# Patient Record
Sex: FEMALE | Race: WHITE
[De-identification: names, ages, dates, MRNs, and addresses within clinical notes are randomized per-mention and may not be internally consistent; named-entity substitution may affect disease eponyms.]

---

## 2017-08-21 ENCOUNTER — HOSPITAL ENCOUNTER (EMERGENCY)
Dept: HOSPITAL 54 - ER | Age: 37
Discharge: HOME | End: 2017-08-21
Payer: COMMERCIAL

## 2017-08-21 DIAGNOSIS — Z53.21: Primary | ICD-10-CM

## 2017-12-27 ENCOUNTER — HOSPITAL ENCOUNTER (EMERGENCY)
Dept: HOSPITAL 54 - ER | Age: 37
Discharge: LEFT BEFORE DISCHARGE | End: 2017-12-27
Payer: COMMERCIAL

## 2017-12-27 DIAGNOSIS — Z53.21: Primary | ICD-10-CM

## 2018-01-23 ENCOUNTER — HOSPITAL ENCOUNTER (EMERGENCY)
Dept: HOSPITAL 54 - ER | Age: 38
Discharge: HOME | End: 2018-01-23
Payer: COMMERCIAL

## 2018-01-23 VITALS — HEIGHT: 64 IN | BODY MASS INDEX: 25.61 KG/M2 | WEIGHT: 150 LBS

## 2018-01-23 VITALS — SYSTOLIC BLOOD PRESSURE: 132 MMHG | DIASTOLIC BLOOD PRESSURE: 83 MMHG

## 2018-01-23 DIAGNOSIS — R05: Primary | ICD-10-CM

## 2018-01-23 PROCEDURE — Z7610: HCPCS

## 2018-01-23 PROCEDURE — A4606 OXYGEN PROBE USED W OXIMETER: HCPCS

## 2019-08-22 ENCOUNTER — HOSPITAL ENCOUNTER (EMERGENCY)
Dept: HOSPITAL 54 - ER | Age: 39
LOS: 1 days | Discharge: HOME | End: 2019-08-23
Payer: COMMERCIAL

## 2019-08-22 VITALS — BODY MASS INDEX: 24.75 KG/M2 | HEIGHT: 64 IN | WEIGHT: 145 LBS

## 2019-08-22 DIAGNOSIS — Z90.49: ICD-10-CM

## 2019-08-22 DIAGNOSIS — R51: Primary | ICD-10-CM

## 2019-08-22 LAB
BASOPHILS # BLD AUTO: 0.1 /CMM (ref 0–0.2)
BASOPHILS NFR BLD AUTO: 1.4 % (ref 0–2)
BUN SERPL-MCNC: 10 MG/DL (ref 7–18)
CALCIUM SERPL-MCNC: 9.2 MG/DL (ref 8.5–10.1)
CHLORIDE SERPL-SCNC: 103 MMOL/L (ref 98–107)
CO2 SERPL-SCNC: 29 MMOL/L (ref 21–32)
CREAT SERPL-MCNC: 0.7 MG/DL (ref 0.6–1.3)
EOSINOPHIL NFR BLD AUTO: 1.3 % (ref 0–6)
GLUCOSE SERPL-MCNC: 98 MG/DL (ref 74–106)
HCT VFR BLD AUTO: 41 % (ref 33–45)
HGB BLD-MCNC: 13.8 G/DL (ref 11.5–14.8)
LYMPHOCYTES NFR BLD AUTO: 2.9 /CMM (ref 0.8–4.8)
LYMPHOCYTES NFR BLD AUTO: 32.1 % (ref 20–44)
MCHC RBC AUTO-ENTMCNC: 34 G/DL (ref 31–36)
MCV RBC AUTO: 89 FL (ref 82–100)
MONOCYTES NFR BLD AUTO: 0.3 /CMM (ref 0.1–1.3)
MONOCYTES NFR BLD AUTO: 3.7 % (ref 2–12)
NEUTROPHILS # BLD AUTO: 5.5 /CMM (ref 1.8–8.9)
NEUTROPHILS NFR BLD AUTO: 61.5 % (ref 43–81)
PLATELET # BLD AUTO: 293 /CMM (ref 150–450)
POTASSIUM SERPL-SCNC: 3.9 MMOL/L (ref 3.5–5.1)
RBC # BLD AUTO: 4.6 MIL/UL (ref 4–5.2)
SODIUM SERPL-SCNC: 140 MMOL/L (ref 136–145)
WBC NRBC COR # BLD AUTO: 8.9 K/UL (ref 4.3–11)

## 2019-08-22 NOTE — NUR
PT TO CT Physical Exam  Mallampati: II  TM Distance: >3 FB  Neck ROM: Full  cardiovascular exam normal  pulmonary exam normal  abdominal exam normal  dental exam normal      Anesthesia Plan  ASA Status: 3  Anesthesia Type: General  Reviewed: Lab Results, Nursing Notes, Pre-Induction Reassessment, Beta Blocker Status, NPO Status, Medications, Past Med History, Problem List, Allergies, Patient Summary, Consultations and EKG  The proposed anesthetic plan, including its risks and benefits, have been discussed with the Patient and Spouse - along with the risks and benefits of alternatives.  Questions were encouraged and answered and the patient and/or representative agrees to proceed.      Anesthesia ROS/Med Hx        Pulmonary Review:  The patient is a current smoker.   Overall Review of Systems Comments:  H/o Lupus anticoagulant, DVT

## 2019-08-22 NOTE — NUR
"C/C HEADACHE X2MO, -BLURRY VISION, +N/-V, -DIZZINESS" PT AAOX4, -SOB, NAD 
NOTED, VSS ,PENDING MD ZURITA

## 2019-08-23 VITALS — SYSTOLIC BLOOD PRESSURE: 123 MMHG | DIASTOLIC BLOOD PRESSURE: 85 MMHG

## 2020-02-25 ENCOUNTER — HOSPITAL ENCOUNTER (EMERGENCY)
Dept: HOSPITAL 54 - ER | Age: 40
LOS: 1 days | Discharge: HOME | End: 2020-02-26
Payer: COMMERCIAL

## 2020-02-25 VITALS
SYSTOLIC BLOOD PRESSURE: 151 MMHG | DIASTOLIC BLOOD PRESSURE: 88 MMHG | WEIGHT: 145 LBS | HEIGHT: 66 IN | BODY MASS INDEX: 23.3 KG/M2
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
## 2020-02-25 DIAGNOSIS — N39.0: ICD-10-CM
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
## 2020-02-25 DIAGNOSIS — S16.1XXA: Primary | ICD-10-CM
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
## 2020-02-25 PROCEDURE — 84703 CHORIONIC GONADOTROPIN ASSAY: CPT

## 2020-02-25 PROCEDURE — 99284 EMERGENCY DEPT VISIT MOD MDM: CPT

## 2020-02-25 PROCEDURE — 81001 URINALYSIS AUTO W/SCOPE: CPT

## 2020-02-25 PROCEDURE — 87086 URINE CULTURE/COLONY COUNT: CPT

## 2020-02-25 PROCEDURE — 96372 THER/PROPH/DIAG INJ SC/IM: CPT

## 2020-02-25 NOTE — NUR
PATIENT CAME TO ER BED 9 C/O RIGHT NECK PAIN THAT RADIATES TO THE RIGHT ARM AND 
LOWER BACK. PT STATES THAT SHE WOKE UP THIS MORNING AND FELT PAIN AND STATES IT 
HAS BEEN GETTING WORSE. 9/10 SHARP PAIN. AAOX4. NO SOB. BREATHING EVENLY AND 
UNLABORED ON ROOM AIR.

## 2020-02-26 LAB
PH UR STRIP: 7 [PH] (ref 5–8)
RBC #/AREA URNS HPF: (no result) /HPF (ref 0–2)
UROBILINOGEN UR STRIP-MCNC: 0.2 EU/DL

## 2020-04-11 ENCOUNTER — HOSPITAL ENCOUNTER (EMERGENCY)
Dept: HOSPITAL 54 - ER | Age: 40
Discharge: HOME | End: 2020-04-11
Payer: COMMERCIAL

## 2020-04-11 VITALS — BODY MASS INDEX: 20.89 KG/M2 | HEIGHT: 66 IN | WEIGHT: 130 LBS

## 2020-04-11 VITALS — SYSTOLIC BLOOD PRESSURE: 127 MMHG | DIASTOLIC BLOOD PRESSURE: 86 MMHG

## 2020-04-11 DIAGNOSIS — R51: Primary | ICD-10-CM

## 2020-04-11 NOTE — NUR
"Been having Headache x6mos worse today. +Nausea", TO ER BED 1, HOOKED TO 
MONITOR, CHANGED TO HOSP GOWN, WARM BLANKET PROVIDED, AWAITING MD ZURITA.

## 2021-08-19 ENCOUNTER — HOSPITAL ENCOUNTER (INPATIENT)
Dept: HOSPITAL 54 - ER | Age: 41
LOS: 4 days | Discharge: HOME | DRG: 137 | End: 2021-08-23
Attending: INTERNAL MEDICINE | Admitting: INTERNAL MEDICINE
Payer: COMMERCIAL

## 2021-08-19 VITALS — SYSTOLIC BLOOD PRESSURE: 120 MMHG | DIASTOLIC BLOOD PRESSURE: 65 MMHG

## 2021-08-19 VITALS — DIASTOLIC BLOOD PRESSURE: 64 MMHG | SYSTOLIC BLOOD PRESSURE: 118 MMHG

## 2021-08-19 VITALS — SYSTOLIC BLOOD PRESSURE: 118 MMHG | DIASTOLIC BLOOD PRESSURE: 64 MMHG

## 2021-08-19 VITALS — HEIGHT: 64 IN | WEIGHT: 150 LBS | BODY MASS INDEX: 25.61 KG/M2

## 2021-08-19 DIAGNOSIS — J12.82: ICD-10-CM

## 2021-08-19 DIAGNOSIS — J96.01: ICD-10-CM

## 2021-08-19 DIAGNOSIS — U07.1: Primary | ICD-10-CM

## 2021-08-19 DIAGNOSIS — N39.0: ICD-10-CM

## 2021-08-19 LAB
ALBUMIN SERPL BCP-MCNC: 3.1 G/DL (ref 3.4–5)
ALP SERPL-CCNC: 44 U/L (ref 46–116)
ALT SERPL W P-5'-P-CCNC: 51 U/L (ref 12–78)
AST SERPL W P-5'-P-CCNC: 48 U/L (ref 15–37)
BASOPHILS # BLD AUTO: 0 K/UL (ref 0–0.2)
BASOPHILS NFR BLD AUTO: 0.3 % (ref 0–2)
BILIRUB DIRECT SERPL-MCNC: 0.2 MG/DL (ref 0–0.2)
BILIRUB SERPL-MCNC: 0.4 MG/DL (ref 0.2–1)
BUN SERPL-MCNC: 7 MG/DL (ref 7–18)
CALCIUM SERPL-MCNC: 8.3 MG/DL (ref 8.5–10.1)
CHLORIDE SERPL-SCNC: 100 MMOL/L (ref 98–107)
CO2 SERPL-SCNC: 30 MMOL/L (ref 21–32)
COLOR UR: YELLOW
CREAT SERPL-MCNC: 0.7 MG/DL (ref 0.6–1.3)
EOSINOPHIL NFR BLD AUTO: 0 % (ref 0–6)
GLUCOSE SERPL-MCNC: 100 MG/DL (ref 74–106)
HCT VFR BLD AUTO: 38 % (ref 33–45)
HGB BLD-MCNC: 12.6 G/DL (ref 11.5–14.8)
LYMPHOCYTES NFR BLD AUTO: 1.2 K/UL (ref 0.8–4.8)
LYMPHOCYTES NFR BLD AUTO: 19.6 % (ref 20–44)
MCHC RBC AUTO-ENTMCNC: 34 G/DL (ref 31–36)
MCV RBC AUTO: 90 FL (ref 82–100)
MONOCYTES NFR BLD AUTO: 0.3 K/UL (ref 0.1–1.3)
MONOCYTES NFR BLD AUTO: 4.4 % (ref 2–12)
NEUTROPHILS # BLD AUTO: 4.5 K/UL (ref 1.8–8.9)
NEUTROPHILS NFR BLD AUTO: 75.7 % (ref 43–81)
PH UR STRIP: 7.5 [PH] (ref 5–8)
PLATELET # BLD AUTO: 221 K/UL (ref 150–450)
POTASSIUM SERPL-SCNC: 3.6 MMOL/L (ref 3.5–5.1)
PROT SERPL-MCNC: 7.8 G/DL (ref 6.4–8.2)
RBC # BLD AUTO: 4.19 MIL/UL (ref 4–5.2)
RBC #/AREA URNS HPF: (no result) /HPF (ref 0–2)
SODIUM SERPL-SCNC: 140 MMOL/L (ref 136–145)
UROBILINOGEN UR STRIP-MCNC: 1 EU/DL
WBC #/AREA URNS HPF: (no result) /HPF (ref 0–3)
WBC NRBC COR # BLD AUTO: 5.9 K/UL (ref 4.3–11)

## 2021-08-19 PROCEDURE — A4216 STERILE WATER/SALINE, 10 ML: HCPCS

## 2021-08-19 PROCEDURE — G0378 HOSPITAL OBSERVATION PER HR: HCPCS

## 2021-08-19 PROCEDURE — C9803 HOPD COVID-19 SPEC COLLECT: HCPCS

## 2021-08-19 PROCEDURE — XW033E5 INTRODUCTION OF REMDESIVIR ANTI-INFECTIVE INTO PERIPHERAL VEIN, PERCUTANEOUS APPROACH, NEW TECHNOLOGY GROUP 5: ICD-10-PCS | Performed by: NURSE PRACTITIONER

## 2021-08-19 RX ADMIN — ENOXAPARIN SODIUM SCH MG: 40 INJECTION SUBCUTANEOUS at 19:10

## 2021-08-19 RX ADMIN — VITAMIN D, TAB 1000IU (100/BT) SCH UNIT: 25 TAB at 19:09

## 2021-08-19 RX ADMIN — Medication SCH MG: at 19:09

## 2021-08-19 NOTE — NUR
41 years old female with hx covid positive presents to er with nausea vomiting 
past 6 days worse today.

## 2021-08-19 NOTE — NUR
RN NOTES



RECEIVED PT FROM ER VIA [facility] ACCOMPANIED BY 2 ER STAFF AND TRANSFERRED TO BED VIA 2 PERSON 
ASSIST. PT IS A/OX4;PT ON 2L OF 02 VIA NC WITH RESPIRATIONS EVEN AND UNLABORED. 
COMPREHENSIVE PHYSICAL ASSESSMENT AND PATIENT CARE DONE. CALL LIGHT WITHIN REACH, SAFETY 
MEASURES AND ISOLATION PRECAUTION IN PLACE, WILL CONTINUE MONITOR AND ASSESS THROUGHOUT THE 
SHIFT. WILL CARRY OUT MD ORDERS ACCORDINGLY. CHARGE RN MADE AWARE.

## 2021-08-19 NOTE — NUR
RN NOTES



@2120 CALLED [facility]  AND SPOKE WITH RENU AND  ADVISED THAT THERE'S AN ORDER FOR 
REMDESIVIR FOR 2130. HE SAID IT NEEDS TO BE ROUTED TO ONCALL PHARMACY AS THEY DONT VERIFY 
FOR THIS KIND OF MEDICATION. CHARGE RN MADE AWARE AND NSG SUP. 



[facility]29[facility] [phone], CALLED AND SPOKE WITH 
HYUN HE MENTIONED THAT REMDESIVIR NEEDS TO HAVE APPROVAL FROM [facility] AND NEEDS TO BE DONE IN 
THE MORNING. HE SAID MDs ARE AWARE ABOUT THIS PROTOCOL. CHARGE RN MADE AWARE AND NSG SUP. 
WILL ENDORSE THIS INFO TO THE MORNING SHIFT.

## 2021-08-20 VITALS — SYSTOLIC BLOOD PRESSURE: 108 MMHG | DIASTOLIC BLOOD PRESSURE: 70 MMHG

## 2021-08-20 VITALS — DIASTOLIC BLOOD PRESSURE: 65 MMHG | SYSTOLIC BLOOD PRESSURE: 135 MMHG

## 2021-08-20 VITALS — DIASTOLIC BLOOD PRESSURE: 68 MMHG | SYSTOLIC BLOOD PRESSURE: 114 MMHG

## 2021-08-20 VITALS — DIASTOLIC BLOOD PRESSURE: 75 MMHG | SYSTOLIC BLOOD PRESSURE: 116 MMHG

## 2021-08-20 VITALS — DIASTOLIC BLOOD PRESSURE: 79 MMHG | SYSTOLIC BLOOD PRESSURE: 124 MMHG

## 2021-08-20 VITALS — SYSTOLIC BLOOD PRESSURE: 106 MMHG | DIASTOLIC BLOOD PRESSURE: 63 MMHG

## 2021-08-20 VITALS — SYSTOLIC BLOOD PRESSURE: 114 MMHG | DIASTOLIC BLOOD PRESSURE: 68 MMHG

## 2021-08-20 LAB
ALBUMIN SERPL BCP-MCNC: 2.8 G/DL (ref 3.4–5)
ALP SERPL-CCNC: 45 U/L (ref 46–116)
ALT SERPL W P-5'-P-CCNC: 47 U/L (ref 12–78)
AST SERPL W P-5'-P-CCNC: 38 U/L (ref 15–37)
BASE EXCESS BLDA CALC-SCNC: 2.7 MMOL/L
BASOPHILS # BLD AUTO: 0 K/UL (ref 0–0.2)
BASOPHILS NFR BLD AUTO: 0.2 % (ref 0–2)
BILIRUB SERPL-MCNC: 0.3 MG/DL (ref 0.2–1)
BUN SERPL-MCNC: 9 MG/DL (ref 7–18)
CALCIUM SERPL-MCNC: 8.5 MG/DL (ref 8.5–10.1)
CHLORIDE SERPL-SCNC: 103 MMOL/L (ref 98–107)
CO2 SERPL-SCNC: 26 MMOL/L (ref 21–32)
CREAT SERPL-MCNC: 0.7 MG/DL (ref 0.6–1.3)
DO-HGB MFR BLDA: 54.2 MMHG
EOSINOPHIL NFR BLD AUTO: 0 % (ref 0–6)
GLUCOSE SERPL-MCNC: 185 MG/DL (ref 74–106)
HCT VFR BLD AUTO: 35 % (ref 33–45)
HGB BLD-MCNC: 12.1 G/DL (ref 11.5–14.8)
INHALED O2 CONCENTRATION: 21 %
LYMPHOCYTES NFR BLD AUTO: 0.8 K/UL (ref 0.8–4.8)
LYMPHOCYTES NFR BLD AUTO: 22.2 % (ref 20–44)
MAGNESIUM SERPL-MCNC: 1.9 MG/DL (ref 1.8–2.4)
MCHC RBC AUTO-ENTMCNC: 34 G/DL (ref 31–36)
MCV RBC AUTO: 89 FL (ref 82–100)
MONOCYTES NFR BLD AUTO: 0.2 K/UL (ref 0.1–1.3)
MONOCYTES NFR BLD AUTO: 5.3 % (ref 2–12)
NEUTROPHILS # BLD AUTO: 2.7 K/UL (ref 1.8–8.9)
NEUTROPHILS NFR BLD AUTO: 72.3 % (ref 43–81)
PCO2 TEMP ADJ BLDA: 36.9 MMHG (ref 35–45)
PH TEMP ADJ BLDA: 7.47 [PH] (ref 7.35–7.45)
PHOSPHATE SERPL-MCNC: 3 MG/DL (ref 2.5–4.9)
PLATELET # BLD AUTO: 238 K/UL (ref 150–450)
PO2 TEMP ADJ BLDA: 51.3 MMHG (ref 75–100)
POTASSIUM SERPL-SCNC: 3.6 MMOL/L (ref 3.5–5.1)
PROT SERPL-MCNC: 7.6 G/DL (ref 6.4–8.2)
RBC # BLD AUTO: 3.99 MIL/UL (ref 4–5.2)
SAO2 % BLDA: 88.5 % (ref 92–98.5)
SODIUM SERPL-SCNC: 141 MMOL/L (ref 136–145)
WBC NRBC COR # BLD AUTO: 3.8 K/UL (ref 4.3–11)

## 2021-08-20 RX ADMIN — ENOXAPARIN SODIUM SCH MG: 40 INJECTION SUBCUTANEOUS at 20:29

## 2021-08-20 RX ADMIN — DEXTROSE MONOHYDRATE SCH MLS/HR: 50 INJECTION, SOLUTION INTRAVENOUS at 17:21

## 2021-08-20 RX ADMIN — VITAMIN D, TAB 1000IU (100/BT) SCH UNIT: 25 TAB at 08:26

## 2021-08-20 RX ADMIN — DEXTROSE MONOHYDRATE SCH MG: 50 INJECTION, SOLUTION INTRAVENOUS at 08:26

## 2021-08-20 RX ADMIN — DEXTROSE MONOHYDRATE SCH MLS/HR: 50 INJECTION, SOLUTION INTRAVENOUS at 16:11

## 2021-08-20 RX ADMIN — Medication SCH MG: at 08:26

## 2021-08-20 NOTE — NUR
TELE RN CLOSING NOTE: 



PATIENT RESTING COMFORTABLY, AO X 4, ON 2 LITERS O2 NASAL CANULA, NO SIGNS OF RESPIRATORY 
DISTRESS,  SATURATING @ 98%. SINUS RHYTHM HR 90.DENIES CHEST PAIN/DISCOMFORT. RT AC G 20 
FLUSHES WELL, SITE CLEAR. REGULAR DIET. BRP. AMBULATORY. CALL LIGHT WITHIN REACH, SAFETY 
MEASURES AND ISOLATION PRECAUTION IN PLACE,

PATIENT KEPT CLEAN & COMFORTABLE WITHIN THE SHIFT. WILL ENDORSE TO INCOMING SHIFT RN WITH 
STABLE VITAL SIGN AND FOR CONTINUITY OF CARE.

## 2021-08-20 NOTE — NUR
TELE RN AM NOTES



PT IN BED, A/OX4;PT ON ROOM AIR AT THIS TIME, WITH 95% O2 SAT. DENIES SOB, RESPIRATIONS EVEN 
AND UNLABORED. SINUS RHYTHM HR 95. DENIES CHEST PAIN/DISCOMFORT. RT AC G 20 FLUSHES WELL, 
SITE CLEAR. REGULAR DIET. BRP. AMBULATORY. CALL LIGHT WITHIN REACH, SAFETY MEASURES AND 
ISOLATION PRECAUTION IN PLACE, POC  DISCUSSED. VERBALIZED UNDERSTANDING. WILL CONTINUE TO 
MONITOR AND ASSESS THROUGHOUT THE SHIFT. WILL CARRY OUT MD ORDERS ACCORDINGLY.

## 2021-08-20 NOTE — NUR
RN OPENING NOTE

Patient is awake, A&Ox4. Walking around in room. Patient reports feeling okay at this time. 
Denies SOB or any type of distress. Patient reports her main struggle overnight in the 
hospital is getting enough sleep -told patient she has an order for PRN Ambien at bedtime 
that can help with sleep. O2 100% on O2 2L via NC. Will continue to monitor.

## 2021-08-20 NOTE — NUR
RN CLOSING NOTE: 



PATIENT REMAINS IN ROOM IN NO SIGNS OF RESPIRATORY DISTRESS, PATIENT ON ROOM AIR ;TOLERATING 
WELL SATURATING @ >95% SP02. SAFETY MEASURES IMPLEMENTED, BED IN LOWEST POSITION, LOCKED, 
SIDE RAILS UP, CALL LIGHT WITHIN REACH. ALL NEEDS AND ORDERS ADDRESSED DURING THE SHIFT. IV 
ACCESS MAINTAINED INTACT, SECURED AND FLUSHING WELL. ALL DUE MEDS GIVEN AS ORDERED & 
SCHEDULED ; PATIENT TOLERATED WELL. PATIENT KEPT CLEAN & COMFORTABLE WITHIN THE SHIFT. 
PATIENT ENDORSED TO INCOMING SHIFT RN WITH STABLE VITAL SIGN AND FOR CONTINUITY OF CARE.

## 2021-08-21 VITALS — DIASTOLIC BLOOD PRESSURE: 77 MMHG | SYSTOLIC BLOOD PRESSURE: 117 MMHG

## 2021-08-21 VITALS — DIASTOLIC BLOOD PRESSURE: 72 MMHG | SYSTOLIC BLOOD PRESSURE: 110 MMHG

## 2021-08-21 VITALS — SYSTOLIC BLOOD PRESSURE: 122 MMHG | DIASTOLIC BLOOD PRESSURE: 78 MMHG

## 2021-08-21 VITALS — SYSTOLIC BLOOD PRESSURE: 120 MMHG | DIASTOLIC BLOOD PRESSURE: 78 MMHG

## 2021-08-21 VITALS — SYSTOLIC BLOOD PRESSURE: 123 MMHG | DIASTOLIC BLOOD PRESSURE: 84 MMHG

## 2021-08-21 VITALS — DIASTOLIC BLOOD PRESSURE: 80 MMHG | SYSTOLIC BLOOD PRESSURE: 123 MMHG

## 2021-08-21 LAB
ALBUMIN SERPL BCP-MCNC: 2.8 G/DL (ref 3.4–5)
ALP SERPL-CCNC: 41 U/L (ref 46–116)
ALT SERPL W P-5'-P-CCNC: 53 U/L (ref 12–78)
AST SERPL W P-5'-P-CCNC: 39 U/L (ref 15–37)
BASOPHILS # BLD AUTO: 0 K/UL (ref 0–0.2)
BASOPHILS NFR BLD AUTO: 0.3 % (ref 0–2)
BILIRUB DIRECT SERPL-MCNC: 0.1 MG/DL (ref 0–0.2)
BILIRUB SERPL-MCNC: 0.3 MG/DL (ref 0.2–1)
EOSINOPHIL NFR BLD AUTO: 0 % (ref 0–6)
HCT VFR BLD AUTO: 36 % (ref 33–45)
HGB BLD-MCNC: 12 G/DL (ref 11.5–14.8)
LYMPHOCYTES NFR BLD AUTO: 1.3 K/UL (ref 0.8–4.8)
LYMPHOCYTES NFR BLD AUTO: 12.9 % (ref 20–44)
MCHC RBC AUTO-ENTMCNC: 34 G/DL (ref 31–36)
MCV RBC AUTO: 90 FL (ref 82–100)
MONOCYTES NFR BLD AUTO: 0.5 K/UL (ref 0.1–1.3)
MONOCYTES NFR BLD AUTO: 5 % (ref 2–12)
NEUTROPHILS # BLD AUTO: 8.3 K/UL (ref 1.8–8.9)
NEUTROPHILS NFR BLD AUTO: 81.8 % (ref 43–81)
PLATELET # BLD AUTO: 297 K/UL (ref 150–450)
PROT SERPL-MCNC: 7.3 G/DL (ref 6.4–8.2)
RBC # BLD AUTO: 3.96 MIL/UL (ref 4–5.2)
WBC NRBC COR # BLD AUTO: 10.2 K/UL (ref 4.3–11)

## 2021-08-21 RX ADMIN — DEXTROSE MONOHYDRATE SCH MG: 50 INJECTION, SOLUTION INTRAVENOUS at 09:15

## 2021-08-21 RX ADMIN — DEXTROSE MONOHYDRATE SCH MLS/HR: 50 INJECTION, SOLUTION INTRAVENOUS at 16:57

## 2021-08-21 RX ADMIN — VITAMIN D, TAB 1000IU (100/BT) SCH UNIT: 25 TAB at 09:15

## 2021-08-21 RX ADMIN — DEXTROSE MONOHYDRATE SCH MLS/HR: 50 INJECTION, SOLUTION INTRAVENOUS at 15:37

## 2021-08-21 RX ADMIN — SODIUM CHLORIDE SCH MLS/HR: 9 INJECTION, SOLUTION INTRAVENOUS at 12:12

## 2021-08-21 RX ADMIN — ENOXAPARIN SODIUM SCH MG: 40 INJECTION SUBCUTANEOUS at 20:37

## 2021-08-21 RX ADMIN — Medication SCH MG: at 09:15

## 2021-08-21 NOTE — NUR
TELE/RN OPENING NOTE



RECEIVED PATIENT RESTING IN BED. AWAKE, ALERT AND ORIENTED X 4. ABLE TO MAKE NEEDS KNOWN. 
DENIES PAIN AT THIS TIME. CONTINUES ON 2L O2 VIA NC WITH NO S/SX OF RESPIRATORY DISTRESS 
NOTED. CONTINUES ON TELE MONITOR WITH CURRENT READING SR. IV ACCESS TO RIGHT AC #20G INTACT, 
PATENT AND SALINE LOCKED. CONTINUES ON IV ABX. CALL LIGHT WITHIN REACH. ASPIRATION, FALL AND 
SAFETY PRECAUTIONS MAINTAINED. WILL CONTINUE TO MONITOR.

## 2021-08-21 NOTE — NUR
TELE RN OPENING NOTES



RECEIVED PATIENT AWAKE IN BED. ALERT AND ORIENTED X4. NO SIGNS OR SYMPTOMS OF DISTRESS 
NOTED. BREATHING IS EVEN AND UNLABORED. NO COMPLAINTS OF PAIN AT THIS TIME. IV ACCESS 
RHAND#20 PATENT, INTACT AND FLUSHING WELL. PATIENT IS CURRENTLY ON  NC 2L WITH SPO2 
SATURATING AT 97%. PATIENT ON EXTERNAL TELE MONITOR WITH READING SR 90. PATIENT IS ABLE TO 
MAKE NEEDS KNOWN. SAFETY MEASURES ARE IN PLACE WITH BED LOCKED AT LOW POSITION, SIDE RAILS 
UP X 2. WILL CONTINUE TO MONITOR THROUGHOUT SHIFT.

## 2021-08-21 NOTE — NUR
TELE RN CLOSING NOTES



PATIENT AWAKE IN BED. ALERT AND ORIENTED X4. NO SIGNS OR SYMPTOMS OF DISTRESS NOTED. 
BREATHING IS EVEN AND UNLABORED. NO COMPLAINTS OF PAIN AT THIS TIME. IV ACCESS RHAND#20 
PATENT, INTACT AND FLUSHING WELL. ALL NEEDS MET THROUGH OUT SHIFT. SAFETY MEASURES 
MAINTAINED WITH BED LOCKED AT LOW POSITION, SIDE RAILS UP X 2. WILL ENDORSE CONTINUITY OF 
CARE TO ONCOMING SHIFT.

## 2021-08-21 NOTE — NUR
TELE RN NOTES



PATIENT WAS SEEN BY DR. FELISHA FLYNN WITH ORDER TO TITRATE O2 TO 1L, ORDERS READ BACK 
AND CARRIED OUT. WILL CONTINUE TO MONITOR PATIENT O2 SATURATION.

## 2021-08-21 NOTE — NUR
TELE RN CLOSING NOTES

 Patient slept well throughout the night after PRN Ambien administration. Denies SOB or any 
kind of distress. O2 saturations 97% and above. NSR on monitor.

## 2021-08-22 VITALS — DIASTOLIC BLOOD PRESSURE: 76 MMHG | SYSTOLIC BLOOD PRESSURE: 115 MMHG

## 2021-08-22 VITALS — DIASTOLIC BLOOD PRESSURE: 68 MMHG | SYSTOLIC BLOOD PRESSURE: 119 MMHG

## 2021-08-22 VITALS — SYSTOLIC BLOOD PRESSURE: 114 MMHG | DIASTOLIC BLOOD PRESSURE: 78 MMHG

## 2021-08-22 VITALS — DIASTOLIC BLOOD PRESSURE: 68 MMHG | SYSTOLIC BLOOD PRESSURE: 115 MMHG

## 2021-08-22 VITALS — DIASTOLIC BLOOD PRESSURE: 72 MMHG | SYSTOLIC BLOOD PRESSURE: 122 MMHG

## 2021-08-22 LAB
ALBUMIN SERPL BCP-MCNC: 2.9 G/DL (ref 3.4–5)
ALP SERPL-CCNC: 42 U/L (ref 46–116)
ALT SERPL W P-5'-P-CCNC: 120 U/L (ref 12–78)
AST SERPL W P-5'-P-CCNC: 100 U/L (ref 15–37)
BILIRUB DIRECT SERPL-MCNC: 0.2 MG/DL (ref 0–0.2)
BILIRUB SERPL-MCNC: 0.4 MG/DL (ref 0.2–1)
CRP SERPL-MCNC: 2.9 MG/DL (ref 0–0.9)
PROT SERPL-MCNC: 7.5 G/DL (ref 6.4–8.2)

## 2021-08-22 RX ADMIN — SODIUM CHLORIDE SCH MLS/HR: 9 INJECTION, SOLUTION INTRAVENOUS at 12:00

## 2021-08-22 RX ADMIN — Medication SCH MG: at 08:31

## 2021-08-22 RX ADMIN — DEXTROSE MONOHYDRATE SCH MG: 50 INJECTION, SOLUTION INTRAVENOUS at 08:31

## 2021-08-22 RX ADMIN — DEXTROSE MONOHYDRATE SCH MLS/HR: 50 INJECTION, SOLUTION INTRAVENOUS at 15:42

## 2021-08-22 RX ADMIN — ENOXAPARIN SODIUM SCH MG: 40 INJECTION SUBCUTANEOUS at 21:19

## 2021-08-22 RX ADMIN — VITAMIN D, TAB 1000IU (100/BT) SCH UNIT: 25 TAB at 08:31

## 2021-08-22 RX ADMIN — DEXTROSE MONOHYDRATE SCH MLS/HR: 50 INJECTION, SOLUTION INTRAVENOUS at 17:05

## 2021-08-22 NOTE — NUR
TELE/RN CLOSING NOTE



PATIENT RESTING IN BED. AWAKE, ALERT AND ORIENTED X 4. ABLE TO MAKE NEEDS KNOWN. DENIES PAIN 
AT THIS TIME.  STEADY AND AMBULATORY. CONTINUES ON 2L O2 VIA NC WITH NO S/SX OF RESPIRATORY 
DISTRESS NOTED. CONTINUES ON TELE MONITOR WITH CURRENT READING SR. IV ACCESS TO RIGHT AC 
#20G INTACT, PATENT AND SALINE LOCKED. CONTINUES ON IV ABX. CALL LIGHT WITHIN REACH. 
ASPIRATION, FALL AND SAFETY PRECAUTIONS MAINTAINED. WILL ENDORSE TO THE NEXT SHIFT FOR [facility].

## 2021-08-22 NOTE — NUR
TELE RN OPENING NOTES



PT A/OX4; ABLE TO MAKE NEEDS KNOWN. ON O2 2LPM VIA N/C; TOLERATING WELL.  EXTERNAL CARDIAC 
MONITOR READS NSR AT 70'S. DENIES PAIN OR DISCOMFORT AT THIS TIME. RAC#20G S/L; PATENT AND 
INTACT. SAFETY MEASURES IN PLACE: BED IS LOWEST LOCKED POSITION, SIDE RAILS UPX2; CALL LIGHT 
WITHIN EASY REACH. PATIENT IN STABLE CONDITION, WILL CONTINUE PLAN OF CARE.

## 2021-08-22 NOTE — NUR
TELE RN NOTE - CONSTIPATION



PT C/O CONSTIPATION X3 DAYS. ADMINISTERED MOM AS PT REQUEST AND ORDERED. WILL MONITOR FOR BM

## 2021-08-23 VITALS — SYSTOLIC BLOOD PRESSURE: 109 MMHG | DIASTOLIC BLOOD PRESSURE: 68 MMHG

## 2021-08-23 VITALS — SYSTOLIC BLOOD PRESSURE: 106 MMHG | DIASTOLIC BLOOD PRESSURE: 61 MMHG

## 2021-08-23 VITALS — SYSTOLIC BLOOD PRESSURE: 120 MMHG | DIASTOLIC BLOOD PRESSURE: 67 MMHG

## 2021-08-23 VITALS — SYSTOLIC BLOOD PRESSURE: 112 MMHG | DIASTOLIC BLOOD PRESSURE: 75 MMHG

## 2021-08-23 VITALS — SYSTOLIC BLOOD PRESSURE: 115 MMHG | DIASTOLIC BLOOD PRESSURE: 77 MMHG

## 2021-08-23 LAB
ALBUMIN SERPL BCP-MCNC: 2.8 G/DL (ref 3.4–5)
ALP SERPL-CCNC: 43 U/L (ref 46–116)
ALT SERPL W P-5'-P-CCNC: 151 U/L (ref 12–78)
AST SERPL W P-5'-P-CCNC: 62 U/L (ref 15–37)
BASE EXCESS BLDA CALC-SCNC: 0.7 MMOL/L
BILIRUB DIRECT SERPL-MCNC: 0.1 MG/DL (ref 0–0.2)
BILIRUB SERPL-MCNC: 0.3 MG/DL (ref 0.2–1)
DO-HGB MFR BLDA: 95.3 MMHG
INHALED O2 CONCENTRATION: 28 %
INHALED O2 FLOW RATE: 2 L/MIN (ref 0–30)
PCO2 TEMP ADJ BLDA: 32.1 MMHG (ref 35–45)
PH TEMP ADJ BLDA: 7.48 [PH] (ref 7.35–7.45)
PO2 TEMP ADJ BLDA: 66.5 MMHG (ref 75–100)
PROT SERPL-MCNC: 7.3 G/DL (ref 6.4–8.2)
SAO2 % BLDA: 93.7 % (ref 92–98.5)
VENTILATION MODE VENT: (no result)

## 2021-08-23 RX ADMIN — Medication SCH MG: at 08:46

## 2021-08-23 RX ADMIN — VITAMIN D, TAB 1000IU (100/BT) SCH UNIT: 25 TAB at 08:46

## 2021-08-23 RX ADMIN — DEXTROSE MONOHYDRATE SCH MG: 50 INJECTION, SOLUTION INTRAVENOUS at 08:46

## 2021-08-23 NOTE — NUR
RN NOTES 

PT NEEDS O2 AT HOME PER DR WALLER ORDER, DISCHARG INSTRUCTION GIVEN TO PT VERBALIZES 
UNDERSTANDING. AWAITING FOR HOME O2 TO BE DELIVER  AT THIS TIME.

## 2021-08-23 NOTE — NUR
RN NOTES 

PT A/OX4; ABLE TO MAKE NEEDS KNOWN. ON O2 2LPM VIA N/C; TOLERATING WELL.ON TELE SR HR NUPUR 
80'S ,  DENIES PAIN OR DISCOMFORT AT THIS TIME. RAC#20G S/L; PATENT AND INTACT. SAFETY 
MEASURES IN PLACE: BED IS LOWEST LOCKED POSITION, SIDE RAILS UPX3; CALL LIGHT WITHIN EASY 
REACH. CONTINUE TO MONITOR.

## 2021-08-23 NOTE — NUR
RN NOTES 

PT DESATURATED TO 86% ON ROOM AIR AT REST , PLACED ON 2L O2 N/C , O2 SAT UP TO 93%. room air

## 2021-08-23 NOTE — NUR
TELE RN CLOSING NOTES



PT A/OX4; ABLE TO MAKE NEEDS KNOWN. ON O2 2LPM VIA N/C; TOLERATING WELL.  EXTERNAL CARDIAC 
MONITOR READS NSR AT 70. DENIES PAIN OR DISCOMFORT AT THIS TIME. RAC#20G S/L; PATENT AND 
INTACT. SAFETY MEASURES IN PLACE: BED IS LOWEST LOCKED POSITION, SIDE RAILS UPX2; CALL LIGHT 
WITHIN EASY REACH. PATIENT IN STABLE CONDITION, WILL ENDORSE PLAN OF CARE TO ONCOMING 
MORNING RN.

## 2021-08-23 NOTE — NUR
RN NOTES 

DISCHARGE INSTRUCTION GIVEN TO PT ,VERBALIZES UNDERSTANDING , AWAITING OR O2 DELIVERY TO BE 
TAKEN HOME AT THIS TIME.

## 2021-08-23 NOTE — NUR
RN NOTE



REPORT RECEIVED FROM KATELYN, PATIENT IN BED, AO X 4, IN NO S/SX OF ACUTE DISTRESS AT THIS 
TIME. BREATHING EVEN AND UNLABORED, SATURATION AT 95% ON 2L VIA NC, SR ON THE MONITOR, HR IS 
81. NOTED IV SITE AT R AC 20G, PATENT AND FLUSHING WELL, NO S/S OF INFECTION OR 
INFILTRATION. PATIENT PENDING DISCHARGE, AWAITING O2 DELIVERY TO BE BROUGHT HOME. SAFETY 
MEASURES IMPLEMENTED. PATIENT BED ALARM IS ON. HEAD OF BED ELEVATED. BED IS LOCKED,  IN 
LOWEST POSITION AND SIDE RAILS UP. CALL LIGHT WITHIN REACH OF THE PATIENT. WILL CONTINUE TO 
MONITOR AND REASSESS FOR ANY CHANGES.

## 2023-09-29 NOTE — NUR
BIBS FOR C/O NAUSEA AND VOMITING WORST X 6 DAYS. +COVID 12 DAYS AGO. AFEBRILE, 
90% SATS ON RA. RESPIRATION UNLABORED. ATTACHED TO THE MONITOR. WILL CONTINUE 
TO MONITOR THE PATIENT. Please call patient to schedule an appointment, patient was informed of Dr. Fitzgerald's message about the covid vaccine and the A1C order that was placed

## 2024-10-31 ENCOUNTER — HOSPITAL ENCOUNTER (EMERGENCY)
Dept: HOSPITAL 54 - ER | Age: 44
Discharge: HOME | End: 2024-10-31
Payer: COMMERCIAL

## 2024-10-31 VITALS — WEIGHT: 160 LBS | BODY MASS INDEX: 27.31 KG/M2 | HEIGHT: 64 IN

## 2024-10-31 VITALS — SYSTOLIC BLOOD PRESSURE: 128 MMHG | OXYGEN SATURATION: 96 % | TEMPERATURE: 98.4 F | DIASTOLIC BLOOD PRESSURE: 82 MMHG

## 2024-10-31 DIAGNOSIS — R51.9: Primary | ICD-10-CM

## 2024-10-31 DIAGNOSIS — R11.2: ICD-10-CM

## 2024-10-31 DIAGNOSIS — E11.9: ICD-10-CM

## 2024-10-31 LAB
BASOPHILS # BLD AUTO: 0 K/UL (ref 0–0.2)
BASOPHILS NFR BLD AUTO: 0.4 % (ref 0–2)
BUN SERPL-MCNC: 7 MG/DL (ref 7–18)
CALCIUM SERPL-MCNC: 9.5 MG/DL (ref 8.5–10.1)
CHLORIDE SERPL-SCNC: 103 MMOL/L (ref 98–107)
CO2 SERPL-SCNC: 29 MMOL/L (ref 21–32)
CREAT SERPL-MCNC: 0.6 MG/DL (ref 0.6–1.3)
EOSINOPHIL # BLD AUTO: 0.1 K/UL (ref 0–0.7)
EOSINOPHIL NFR BLD AUTO: 0.9 % (ref 0–6)
ERYTHROCYTE [DISTWIDTH] IN BLOOD BY AUTOMATED COUNT: 13 % (ref 11.5–15)
GLUCOSE SERPL-MCNC: 170 MG/DL (ref 74–106)
HCT VFR BLD AUTO: 42 % (ref 33–45)
HGB BLD-MCNC: 14.2 G/DL (ref 11.5–14.8)
LYMPHOCYTES NFR BLD AUTO: 18.7 % (ref 20–44)
LYMPHOCYTES NFR BLD AUTO: 2 K/UL (ref 0.8–4.8)
MCH RBC QN AUTO: 31 PG (ref 26–33)
MCHC RBC AUTO-ENTMCNC: 34 G/DL (ref 31–36)
MCV RBC AUTO: 90 FL (ref 82–100)
MONOCYTES NFR BLD AUTO: 0.4 K/UL (ref 0.1–1.3)
MONOCYTES NFR BLD AUTO: 3.5 % (ref 2–12)
NEUTROPHILS # BLD AUTO: 8.1 K/UL (ref 1.8–8.9)
NEUTROPHILS NFR BLD AUTO: 76.5 % (ref 43–81)
PLATELET # BLD AUTO: 290 K/UL (ref 150–450)
POTASSIUM SERPL-SCNC: 4.4 MMOL/L (ref 3.5–5.1)
RBC # BLD AUTO: 4.66 MIL/UL (ref 4–5.2)
SODIUM SERPL-SCNC: 139 MMOL/L (ref 136–145)
WBC NRBC COR # BLD AUTO: 10.6 K/UL (ref 4.3–11)

## 2024-10-31 PROCEDURE — 96374 THER/PROPH/DIAG INJ IV PUSH: CPT

## 2024-10-31 PROCEDURE — 36415 COLL VENOUS BLD VENIPUNCTURE: CPT

## 2024-10-31 PROCEDURE — 80048 BASIC METABOLIC PNL TOTAL CA: CPT

## 2024-10-31 PROCEDURE — A4223 INFUSION SUPPLIES W/O PUMP: HCPCS

## 2024-10-31 PROCEDURE — 96361 HYDRATE IV INFUSION ADD-ON: CPT

## 2024-10-31 PROCEDURE — 70450 CT HEAD/BRAIN W/O DYE: CPT

## 2024-10-31 PROCEDURE — 99285 EMERGENCY DEPT VISIT HI MDM: CPT

## 2024-10-31 PROCEDURE — 85025 COMPLETE CBC W/AUTO DIFF WBC: CPT

## 2024-10-31 RX ADMIN — ACETAMINOPHEN ONE MG: 500 TABLET ORAL at 08:30

## 2024-10-31 RX ADMIN — SODIUM CHLORIDE ONE MG: 9 INJECTION, SOLUTION INTRAVENOUS at 08:30

## 2024-10-31 RX ADMIN — SODIUM CHLORIDE ONE ML: 9 INJECTION, SOLUTION INTRAVENOUS at 08:35
